# Patient Record
Sex: MALE | Race: OTHER
[De-identification: names, ages, dates, MRNs, and addresses within clinical notes are randomized per-mention and may not be internally consistent; named-entity substitution may affect disease eponyms.]

---

## 2022-06-03 ENCOUNTER — APPOINTMENT (OUTPATIENT)
Dept: ORTHOPEDIC SURGERY | Facility: CLINIC | Age: 52
End: 2022-06-03
Payer: OTHER MISCELLANEOUS

## 2022-06-03 VITALS — BODY MASS INDEX: 30.64 KG/M2 | WEIGHT: 214 LBS | HEIGHT: 70 IN

## 2022-06-03 DIAGNOSIS — Z78.9 OTHER SPECIFIED HEALTH STATUS: ICD-10-CM

## 2022-06-03 DIAGNOSIS — E11.9 TYPE 2 DIABETES MELLITUS W/OUT COMPLICATIONS: ICD-10-CM

## 2022-06-03 PROCEDURE — 99072 ADDL SUPL MATRL&STAF TM PHE: CPT

## 2022-06-03 PROCEDURE — 99214 OFFICE O/P EST MOD 30 MIN: CPT

## 2022-06-03 RX ORDER — LOSARTAN POTASSIUM 25 MG/1
25 TABLET, FILM COATED ORAL
Qty: 21 | Refills: 0 | Status: ACTIVE | COMMUNITY
Start: 2021-06-22

## 2022-06-03 RX ORDER — DULAGLUTIDE 1.5 MG/.5ML
1.5 INJECTION, SOLUTION SUBCUTANEOUS
Qty: 2 | Refills: 0 | Status: ACTIVE | COMMUNITY
Start: 2021-11-02

## 2022-06-03 RX ORDER — ATORVASTATIN CALCIUM 20 MG/1
20 TABLET, FILM COATED ORAL
Qty: 21 | Refills: 0 | Status: ACTIVE | COMMUNITY
Start: 2021-06-22

## 2022-06-03 RX ORDER — METFORMIN HYDROCHLORIDE 500 MG/1
500 TABLET, COATED ORAL
Qty: 84 | Refills: 0 | Status: ACTIVE | COMMUNITY
Start: 2021-07-26

## 2022-06-03 NOTE — WORK
[Mild Partial] : mild partial [Does not reveal pre-existing condition(s) that may affect treatment/prognosis] : does not reveal pre-existing condition(s) that may affect treatment/prognosis [Can return to work without limitations on ______] : can return to work without limitations on [unfilled] [Patient] : patient [No Rx restrictions] : No Rx restrictions. [I provided the services listed above] :  I provided the services listed above. [FreeTextEntry1] : fair

## 2022-06-03 NOTE — DISCUSSION/SUMMARY
[de-identified] : Patient allowed to gently start resuming activities. \par Discussed change to medication prescription and usage. \par Bracing options discussed with patient. \par Activity modification as needed\par

## 2022-06-03 NOTE — HISTORY OF PRESENT ILLNESS
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

## 2022-08-05 ENCOUNTER — APPOINTMENT (OUTPATIENT)
Dept: ORTHOPEDIC SURGERY | Facility: CLINIC | Age: 52
End: 2022-08-05

## 2022-08-05 VITALS — WEIGHT: 214 LBS | HEIGHT: 70 IN | BODY MASS INDEX: 30.64 KG/M2

## 2022-08-05 PROCEDURE — 99213 OFFICE O/P EST LOW 20 MIN: CPT

## 2022-08-05 PROCEDURE — 99072 ADDL SUPL MATRL&STAF TM PHE: CPT

## 2022-08-05 NOTE — WORK
[Mild Partial] : mild partial [Does not reveal pre-existing condition(s) that may affect treatment/prognosis] : does not reveal pre-existing condition(s) that may affect treatment/prognosis [Can return to work without limitations on ______] : can return to work without limitations on [unfilled] [Patient] : patient [No Rx restrictions] : No Rx restrictions. [I provided the services listed above] :  I provided the services listed above. [FreeTextEntry1] : poor

## 2022-08-05 NOTE — DISCUSSION/SUMMARY
[de-identified] : Patient allowed to gently start resuming activities. \par Discussed change to medication prescription and usage. \par Bracing options discussed with patient. \par Activity modification as needed\par

## 2022-08-05 NOTE — PHYSICAL EXAM
[4___] : flexion 4[unfilled]/5 [] : no ecchymosis [FreeTextEntry3] : deformity biceps tendon tear [TWNoteComboBox7] : flexion 135 degrees

## 2022-08-05 NOTE — HISTORY OF PRESENT ILLNESS
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

## 2022-08-12 ENCOUNTER — APPOINTMENT (OUTPATIENT)
Dept: ORTHOPEDIC SURGERY | Facility: CLINIC | Age: 52
End: 2022-08-12

## 2022-08-12 VITALS — WEIGHT: 214 LBS | HEIGHT: 70 IN | BODY MASS INDEX: 30.64 KG/M2

## 2022-08-12 DIAGNOSIS — Z00.00 ENCOUNTER FOR GENERAL ADULT MEDICAL EXAMINATION W/OUT ABNORMAL FINDINGS: ICD-10-CM

## 2022-08-12 DIAGNOSIS — M65.9 SYNOVITIS AND TENOSYNOVITIS, UNSPECIFIED: ICD-10-CM

## 2022-08-12 DIAGNOSIS — M70.51 OTHER BURSITIS OF KNEE, RIGHT KNEE: ICD-10-CM

## 2022-08-12 PROCEDURE — 99213 OFFICE O/P EST LOW 20 MIN: CPT

## 2022-08-12 PROCEDURE — 73564 X-RAY EXAM KNEE 4 OR MORE: CPT | Mod: RT

## 2022-08-12 PROCEDURE — 99072 ADDL SUPL MATRL&STAF TM PHE: CPT

## 2022-08-12 NOTE — PHYSICAL EXAM
[Right] : right knee [Degenerative change] : Degenerative change [] : no ecchymosis [FreeTextEntry9] : sl [TWNoteComboBox7] : flexion 135 degrees

## 2022-08-12 NOTE — HISTORY OF PRESENT ILLNESS
[Work related] : work related [Sudden] : sudden [0] : 0 [Dull/Aching] : dull/aching [Localized] : localized [Work] : work [Meds] : meds [Full time] : Work status: full time [de-identified] : WRI 6/23/22, was carrying motor up stairs, and motor rolled back on top step and struck his knee, he has worked, no prior issues with his knee, on no meds [] : no [FreeTextEntry1] : rt knee [FreeTextEntry3] : 6-23-22 [FreeTextEntry5] : coworker dropped motor on patient [FreeTextEntry9] : meditate [de-identified] : driving

## 2022-08-12 NOTE — DISCUSSION/SUMMARY
[de-identified] : Patient allowed to gently start resuming activities. \par Discussed change to medication prescription and usage. \par Bracing options discussed with patient. \par Activity modification as needed\par Attention:  Nurse Reviewer /Medical Director\par \par I am writing this letter as request for worker's comp auth an MRI R knee\par \par [Patient has tried analgesics, non-steroid anti-inflammatory agents, \par )   which in combination or by themselves has not worked.\par Based on my patient's condition, I strongly believe that the MRI is necessary.  The results there of will dictate further treatment recommendations\par Thank you for your time and consideration.   \par  \par \par \par \par \par \par \par

## 2022-09-30 ENCOUNTER — APPOINTMENT (OUTPATIENT)
Dept: ORTHOPEDIC SURGERY | Facility: CLINIC | Age: 52
End: 2022-09-30

## 2022-09-30 VITALS — WEIGHT: 214 LBS | HEIGHT: 70 IN | BODY MASS INDEX: 30.64 KG/M2

## 2022-09-30 DIAGNOSIS — S46.219A STRAIN OF MUSCLE, FASCIA AND TENDON OF OTHER PARTS OF BICEPS, UNSPECIFIED ARM, INITIAL ENCOUNTER: ICD-10-CM

## 2022-09-30 PROCEDURE — 99455 WORK RELATED DISABILITY EXAM: CPT

## 2022-09-30 PROCEDURE — 99072 ADDL SUPL MATRL&STAF TM PHE: CPT

## 2022-09-30 NOTE — HISTORY OF PRESENT ILLNESS
[Work related] : work related [Sudden] : sudden [Radiating] : radiating [Work] : work [de-identified] : WRI  8/1/21 biceps was not treated surgically and has  weakness and does different somewhat lighter work and occasional soreness and has weakness [Left Arm] : left arm [0] : 0 [Burning] : burning [Dull/Aching] : dull/aching [Localized] : localized [Shooting] : shooting [Constant] : constant [Rest] : rest [Meds] : meds [Nothing helps with pain getting better] : Nothing helps with pain getting better [] : no [Light duty] : Work status: light duty [FreeTextEntry1] : left elbow [FreeTextEntry3] : 8-1-21 [FreeTextEntry5] : coworker dropped motor on patient [FreeTextEntry6] : discomfort [FreeTextEntry7] : arm/ bicep [FreeTextEntry9] : meditate [de-identified] : lifting [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

## 2022-09-30 NOTE — DISCUSSION/SUMMARY
[de-identified] : Patient allowed to gently start resuming activities. \par Discussed change to medication prescription and usage. \par Activity modification as needed\par

## 2022-09-30 NOTE — PHYSICAL EXAM
[Left] : left elbow [4___] : flexion 4[unfilled]/5 [] : motor exam 5/5  [FreeTextEntry3] : deformity biceps tendon tear [TWNoteComboBox7] : False
